# Patient Record
Sex: MALE | Race: WHITE
[De-identification: names, ages, dates, MRNs, and addresses within clinical notes are randomized per-mention and may not be internally consistent; named-entity substitution may affect disease eponyms.]

---

## 2017-10-11 ENCOUNTER — HOSPITAL ENCOUNTER (EMERGENCY)
Dept: HOSPITAL 61 - ER | Age: 42
Discharge: HOME | End: 2017-10-11
Payer: OTHER GOVERNMENT

## 2017-10-11 VITALS — SYSTOLIC BLOOD PRESSURE: 114 MMHG | DIASTOLIC BLOOD PRESSURE: 72 MMHG

## 2017-10-11 VITALS — BODY MASS INDEX: 25.48 KG/M2 | HEIGHT: 70 IN | WEIGHT: 178 LBS

## 2017-10-11 DIAGNOSIS — R10.11: ICD-10-CM

## 2017-10-11 DIAGNOSIS — R55: Primary | ICD-10-CM

## 2017-10-11 LAB
ALBUMIN SERPL-MCNC: 4.2 G/DL (ref 3.4–5)
ALP SERPL-CCNC: 125 U/L (ref 46–116)
ALT SERPL-CCNC: 108 U/L (ref 16–63)
AST SERPL-CCNC: 39 U/L (ref 15–37)
BACTERIA #/AREA URNS HPF: 0 /HPF
BASOPHILS # BLD AUTO: 0.1 X10^3/UL (ref 0–0.2)
BASOPHILS NFR BLD: 1 % (ref 0–3)
BILIRUB DIRECT SERPL-MCNC: 0.1 MG/DL (ref 0–0.2)
BILIRUB SERPL-MCNC: 0.6 MG/DL (ref 0.2–1)
BILIRUB UR QL STRIP: NEGATIVE
CO2 BLD-SCNC: 23 MMOL/L (ref 23–32)
EOSINOPHIL NFR BLD: 4 % (ref 0–3)
ERYTHROCYTE [DISTWIDTH] IN BLOOD BY AUTOMATED COUNT: 13.4 % (ref 11.5–14.5)
GLUCOSE BLD-MCNC: 100 MG/DL (ref 70–99)
GLUCOSE UR STRIP-MCNC: NEGATIVE MG/DL
HCT VFR BLD AUTO: 50 % (ref 37–52)
HCT VFR BLD CALC: 50.9 % (ref 39–53)
HGB BLD-MCNC: 17.6 G/DL (ref 13–17.5)
LYMPHOCYTES # BLD: 2.1 X10^3/UL (ref 1–4.8)
LYMPHOCYTES NFR BLD AUTO: 24 % (ref 24–48)
MAGNESIUM SERPL-MCNC: 1.7 MG/DL (ref 1.8–2.4)
MCH RBC QN AUTO: 31 PG (ref 25–35)
MCHC RBC AUTO-ENTMCNC: 35 G/DL (ref 31–37)
MCV RBC AUTO: 91 FL (ref 79–100)
MONOCYTES NFR BLD: 6 % (ref 0–9)
NEUTROPHILS NFR BLD AUTO: 65 % (ref 31–73)
NITRITE UR QL STRIP: NEGATIVE
PH UR STRIP: 5.5 [PH]
PLATELET # BLD AUTO: 248 X10^3/UL (ref 140–400)
POTASSIUM BLD-SCNC: 4.2 MMOL/L (ref 3.5–5)
PROT SERPL-MCNC: 7.9 G/DL (ref 6.4–8.2)
PROT UR STRIP-MCNC: NEGATIVE MG/DL
RBC # BLD AUTO: 5.62 X10^6/UL (ref 4.3–5.7)
RBC #/AREA URNS HPF: 0 /HPF (ref 0–2)
SODIUM SERPL-SCNC: 138 MMOL/L (ref 135–145)
SP GR UR STRIP: 1.02
SQUAMOUS #/AREA URNS LPF: (no result) /LPF
UROBILINOGEN UR-MCNC: 0.2 MG/DL
WBC # BLD AUTO: 8.6 X10^3/UL (ref 4–11)
WBC #/AREA URNS HPF: 0 /HPF (ref 0–4)

## 2017-10-11 PROCEDURE — 80047 BASIC METABLC PNL IONIZED CA: CPT

## 2017-10-11 PROCEDURE — 84484 ASSAY OF TROPONIN QUANT: CPT

## 2017-10-11 PROCEDURE — 74177 CT ABD & PELVIS W/CONTRAST: CPT

## 2017-10-11 PROCEDURE — 36415 COLL VENOUS BLD VENIPUNCTURE: CPT

## 2017-10-11 PROCEDURE — 99285 EMERGENCY DEPT VISIT HI MDM: CPT

## 2017-10-11 PROCEDURE — 93005 ELECTROCARDIOGRAM TRACING: CPT

## 2017-10-11 PROCEDURE — 96360 HYDRATION IV INFUSION INIT: CPT

## 2017-10-11 PROCEDURE — 83690 ASSAY OF LIPASE: CPT

## 2017-10-11 PROCEDURE — 71010: CPT

## 2017-10-11 PROCEDURE — 80076 HEPATIC FUNCTION PANEL: CPT

## 2017-10-11 PROCEDURE — 83735 ASSAY OF MAGNESIUM: CPT

## 2017-10-11 PROCEDURE — 81001 URINALYSIS AUTO W/SCOPE: CPT

## 2017-10-11 PROCEDURE — 85025 COMPLETE CBC W/AUTO DIFF WBC: CPT

## 2017-10-11 NOTE — RAD
Indication right upper quadrant pain.



Axial images through the abdomen and pelvis were obtained. 75 cc of Omnipaque

300 was administered intravenously. No oral contrast was administered. No

prior imaging of the abdomen or pelvis is available.



The lung bases are clear. The liver and spleen appear unremarkable. There is a

small hiatus hernia. Clips are noted in the gallbladder fossa. No adrenal or

renal pathology is seen. The pancreas appears normal. No mass inflammatory

process or acute finding in the abdomen is seen. In the pelvis the appendix is

seen in the right lower quadrant and appears unremarkable. No acute finding in

the pelvis is seen



IMPRESSION: No acute or significant finding seen in the abdomen or pelvis

## 2017-10-11 NOTE — PHYS DOC
Past Medical History


Past Medical History:  No Pertinent History





Adult General


Chief Complaint


Chief Complaint:  possible syncope vs seizure





HPI


HPI





Patient is a 42  year old male who presents after syncopal episode. Patient was 

at work and he stood up immediately had intense pain in his right upper 

quadrant or right lower chest and he went outside to get some fresh air, felt 

nauseous, sat down and then passed out. Witnesses reported some shaking-like 

activity, the patient did lose urine but he was not postictal. The first thing 

he remembers is somebody putting a wet washcloth on his head. He has had this 

pain in the past, similar to when he required his gallbladder being removed 

several years ago. Patient has intermittently had this pain but not lost 

consciousness. The pain is present at this time but significantly improved. He 

denies any recent cough, fevers, abdominal pain, dysuria.  Pt's pcp at [facility] but 

hasn't seen for some time.





Review of Systems


Review of Systems





Constitutional: Denies fever or chills []


Eyes: Denies change in visual acuity, redness, or eye pain []


HENT: Denies nasal congestion or sore throat []


Respiratory: Denies cough or shortness of breath []


Cardiovascular: points to R lower chest at site of pain


GI: Denies  stools or diarrhea []


: Denies dysuria or hematuria []


Musculoskeletal: Denies back pain or joint pain []


Integument: Denies rash or skin lesions []


Neurologic: Denies headache, focal weakness or sensory changes []





Current Medications


Current Medications





Current Medications








 Medications


  (Trade)  Dose


 Ordered  Sig/Dmitry  Start Time


 Stop Time Status Last Admin


Dose Admin


 


 Info


  (Do NOT chart on


 this entry -- for


 MONITORING)  1 each  PRN DAILY  PRN  10/11/17 12:00


 10/13/17 11:59   


 


 


 Iohexol


  (Omnipaque 300


 Mg/ml)  75 ml  1X  ONCE  10/11/17 12:00


 10/11/17 12:01 DC 10/11/17 12:21


75 ML


 


 Sodium Chloride  1,000 ml @ 


 1,000 mls/hr  1X  ONCE  10/11/17 11:15


 10/11/17 12:14 DC 10/11/17 11:15


1,000 MLS/HR











Allergies


Allergies





Allergies








Coded Allergies Type Severity Reaction Last Updated Verified


 


  No Known Drug Allergies    10/11/17 No











Physical Exam


Physical Exam





Constitutional: Well developed, well nourished, no acute distress, non-toxic 

appearance. []


HENT: Normocephalic, atraumatic, bilateral external ears normal, oropharynx 

moist, no oral exudates, nose normal. []


Eyes: PERRLA, EOMI, conjunctiva normal, no discharge. [] 


Neck: Normal range of motion, no tenderness, supple, no stridor. [] 


Cardiovascular:Heart rate regular with regular rhythm, no murmur []


Lungs & Thorax:  Bilateral breath sounds clear to auscultation []


Abdomen: Bowel sounds normal, soft, TTp in RUQ, nondistended, no peritoneal 

signs.


Skin: Warm, dry, no erythema, no rash. [] 


Back: No tenderness, no CVA tenderness. [] 


Extremities: No tenderness, no cyanosis, no clubbing, ROM intact, no edema. [] 


Neurologic: Alert and oriented X 3, normal motor function, normal sensory 

function, no focal deficits noted.CN II-XII intact, 5/5 bilateral hand 


Psychologic: Affect normal, judgement normal, mood normal. []





Current Patient Data


Vital Signs





 Vital Signs








  Date Time  Temp Pulse Resp B/P (MAP) Pulse Ox O2 Delivery O2 Flow Rate FiO2


 


10/11/17 11:10 98.1 83 20 116/73 (87) 99 Room Air  





 98.1       








Lab Values





 Laboratory Tests








Test


  10/11/17


11:11 10/11/17


11:16


 


White Blood Count


  8.6 x10^3/uL


(4.0-11.0) 


 


 


Red Blood Count


  5.62 x10^6/uL


(4.30-5.70) 


 


 


Hemoglobin


  17.6 g/dL


(13.0-17.5)  H 


 


 


Hematocrit


  50.9 %


(39.0-53.0) 


 


 


Mean Corpuscular Volume


  91 fL ()


  


 


 


Mean Corpuscular Hemoglobin 31 pg (25-35)   


 


Mean Corpuscular Hemoglobin


Concent 35 g/dL


(31-37) 


 


 


Red Cell Distribution Width


  13.4 %


(11.5-14.5) 


 


 


Platelet Count


  248 x10^3/uL


(140-400) 


 


 


Neutrophils (%) (Auto) 65 % (31-73)   


 


Lymphocytes (%) (Auto) 24 % (24-48)   


 


Monocytes (%) (Auto) 6 % (0-9)   


 


Eosinophils (%) (Auto) 4 % (0-3)  H 


 


Basophils (%) (Auto) 1 % (0-3)   


 


Neutrophils # (Auto)


  5.6 x10^3uL


(1.8-7.7) 


 


 


Lymphocytes # (Auto)


  2.1 x10^3/uL


(1.0-4.8) 


 


 


Monocytes # (Auto)


  0.5 x10^3/uL


(0.0-1.1) 


 


 


Eosinophils # (Auto)


  0.3 x10^3/uL


(0.0-0.7) 


 


 


Basophils # (Auto)


  0.1 x10^3/uL


(0.0-0.2) 


 


 


Magnesium Level


  1.7 mg/dL


(1.8-2.4)  L 


 


 


Total Bilirubin


  0.6 mg/dL


(0.2-1.0) 


 


 


Direct Bilirubin


  0.1 mg/dL


(0.0-0.2) 


 


 


Aspartate Amino Transferase


(AST) 39 U/L (15-37)


H 


 


 


Alanine Aminotransferase (ALT)


  108 U/L


(16-63)  H 


 


 


Alkaline Phosphatase


  125 U/L


()  H 


 


 


Troponin I Quantitative


  < 0.017 ng/mL


(0.000-0.055) 


 


 


Total Protein


  7.9 g/dL


(6.4-8.2) 


 


 


Albumin


  4.2 g/dL


(3.4-5.0) 


 


 


Lipase


  145 U/L


() 


 


 


POC Hemoglobin


  


  17.0 g/dL


(14-18)


 


POC Hematocrit  50 % (37-52)  


 


POC Sodium


  


  138 mmol/L


(135-145)


 


POC Potassium


  


  4.2 mmol/L


(3.5-5.0)


 


POC Chloride


  


  102 mmol/L


()


 


POC Total CO2


  


  23 mmol/L


(23-32)


 


Anion Gap


  


  18 mmol/L


(6-14)  H


 


POC Blood Urea Nitrogen


  


  12 mg/dL


(8-26)


 


POC Creatinine


  


  1.0 mg/dL


(0.5-1.4)


 


Glucose Level


  


  100 mg/dL


(70-99)  H


 


POC Ionized Calcium (Abner)


  


  1.13 mmol/L


(1.13-1.32)





 Laboratory Tests


10/11/17 11:11








 Laboratory Tests


10/11/17 11:16











EKG


EKG


76 bpm, sinus, normal axis, normal intervals, no ST elevation or depression, 

nonischemic T waves, interpreted by me[]





Radiology/Procedures


Radiology/Procedures


X-ray:


Portable chest, 10/11/2017:





History: Right upper quadrant pain, syncope





The heart size and pulmonary vascularity are normal. No pulmonary infiltrates


are seen. There is no evidence of pleural fluid.





IMPRESSION: No acute cardiopulmonary abnormality is detected.








Abd/pelvis CT:


Indication right upper quadrant pain.





Axial images through the abdomen and pelvis were obtained. 75 cc of Omnipaque


300 was administered intravenously. No oral contrast was administered. No


prior imaging of the abdomen or pelvis is available.





The lung bases are clear. The liver and spleen appear unremarkable. There is a


small hiatus hernia. Clips are noted in the gallbladder fossa. No adrenal or


renal pathology is seen. The pancreas appears normal. No mass inflammatory


process or acute finding in the abdomen is seen. In the pelvis the appendix is


seen in the right lower quadrant and appears unremarkable. No acute finding in


the pelvis is seen





IMPRESSION: No acute or significant finding seen in the abdomen or pelvis





Course & Med Decision Making


Course & Med Decision Making


Pertinent Labs and Imaging studies reviewed. (See chart for details)





[]IV fluids ordered along with lab work, urinalysis and chest x-ray.





No acute findings on Ed workup other than slightly elevated liver enzymes, 

which pt reports is chronic in natures.  I think pt had a vasovagal syncopal 

episode 2/2 to the pain.  Reportedly this intermittent pain has occurred 

multiple times in the past and self- resolves shortly.  I strongly encouraged 

pt to f/u with GI specialist for further evaluation and use caution, especially 

if the pain returns.  he voiced understanding and his wife will take pt home.





Dragon Disclaimer


Dragon Disclaimer


This electronic medical record was generated, in whole or in part, using a 

voice recognition dictation system.





Departure


Departure


Impression:  


 Primary Impression:  


 Abdominal pain


 Additional Impression:  


 Syncope


Disposition:  01 HOME, SELF-CARE


Condition:  IMPROVED





Problem Qualifiers











VALENTINA MCNEIL MD Oct 11, 2017 11:21

## 2017-10-11 NOTE — EKG
[facility]

               [address] [place] [address]

Test Date:    2017-10-11               Test Time:    11:12:15

Pat Name:     RENALDO RAMOS           Department:   

Patient ID:   [id number]           Room:          

Gender:       M                        Technician:   

:          1975               Requested By: VALENTINA MCNEIL

Order Number: [id number]            Reading MD:   Inge Priest

                                 Measurements

Intervals                              Axis          

Rate:         76                       P:            55

WV:           138                      QRS:          62

QRSD:         94                       T:            43

QT:           350                                    

QTc:          398                                    

                           Interpretive Statements

SINUS RHYTHM



NORMAL ECG



Electronically Signed On 10- 15:17:28 CDT by Inge Priest

## 2020-07-25 ENCOUNTER — TELEPHONE ENCOUNTER (OUTPATIENT)
Dept: URBAN - METROPOLITAN AREA CLINIC 13 | Facility: CLINIC | Age: 45
End: 2020-07-25

## 2020-07-26 ENCOUNTER — TELEPHONE ENCOUNTER (OUTPATIENT)
Dept: URBAN - METROPOLITAN AREA CLINIC 13 | Facility: CLINIC | Age: 45
End: 2020-07-26

## 2020-10-06 ENCOUNTER — HOSPITAL ENCOUNTER (EMERGENCY)
Dept: HOSPITAL 61 - ER | Age: 45
Discharge: HOME | End: 2020-10-06
Payer: OTHER GOVERNMENT

## 2020-10-06 VITALS — BODY MASS INDEX: 25.31 KG/M2 | HEIGHT: 71 IN | WEIGHT: 180.78 LBS

## 2020-10-06 VITALS — SYSTOLIC BLOOD PRESSURE: 132 MMHG | DIASTOLIC BLOOD PRESSURE: 90 MMHG

## 2020-10-06 DIAGNOSIS — F17.200: ICD-10-CM

## 2020-10-06 DIAGNOSIS — R07.2: Primary | ICD-10-CM

## 2020-10-06 LAB
ALBUMIN SERPL-MCNC: 4.3 G/DL (ref 3.4–5)
ALBUMIN/GLOB SERPL: 1.2 {RATIO} (ref 1–1.7)
ALP SERPL-CCNC: 122 U/L (ref 46–116)
ALT SERPL-CCNC: 137 U/L (ref 16–63)
AMPHETAMINE/METHAMPHETAMINE: (no result)
ANION GAP SERPL CALC-SCNC: 10 MMOL/L (ref 6–14)
APTT PPP: YELLOW S
AST SERPL-CCNC: 43 U/L (ref 15–37)
BACTERIA #/AREA URNS HPF: 0 /HPF
BARBITURATES UR-MCNC: (no result) UG/ML
BASOPHILS # BLD AUTO: 0.1 X10^3/UL (ref 0–0.2)
BASOPHILS NFR BLD: 1 % (ref 0–3)
BENZODIAZ UR-MCNC: (no result) UG/L
BILIRUB SERPL-MCNC: 0.5 MG/DL (ref 0.2–1)
BILIRUB UR QL STRIP: NEGATIVE
BUN SERPL-MCNC: 12 MG/DL (ref 8–26)
BUN/CREAT SERPL: 12 (ref 6–20)
CALCIUM SERPL-MCNC: 9.2 MG/DL (ref 8.5–10.1)
CANNABINOIDS UR-MCNC: (no result) UG/L
CHLORIDE SERPL-SCNC: 100 MMOL/L (ref 98–107)
CO2 SERPL-SCNC: 26 MMOL/L (ref 21–32)
COCAINE UR-MCNC: (no result) NG/ML
CREAT SERPL-MCNC: 1 MG/DL (ref 0.7–1.3)
EOSINOPHIL NFR BLD: 0.4 X10^3/UL (ref 0–0.7)
EOSINOPHIL NFR BLD: 4 % (ref 0–3)
ERYTHROCYTE [DISTWIDTH] IN BLOOD BY AUTOMATED COUNT: 13.5 % (ref 11.5–14.5)
FIBRINOGEN PPP-MCNC: CLEAR MG/DL
GFR SERPLBLD BASED ON 1.73 SQ M-ARVRAT: 80.1 ML/MIN
GLUCOSE SERPL-MCNC: 95 MG/DL (ref 70–99)
HCT VFR BLD CALC: 53.9 % (ref 39–53)
HGB BLD-MCNC: 18.9 G/DL (ref 13–17.5)
LIPASE: 102 U/L (ref 73–393)
LYMPHOCYTES # BLD: 2 X10^3/UL (ref 1–4.8)
LYMPHOCYTES NFR BLD AUTO: 24 % (ref 24–48)
MAGNESIUM SERPL-MCNC: 1.8 MG/DL (ref 1.8–2.4)
MCH RBC QN AUTO: 32 PG (ref 25–35)
MCHC RBC AUTO-ENTMCNC: 35 G/DL (ref 31–37)
MCV RBC AUTO: 93 FL (ref 79–100)
METHADONE SERPL-MCNC: (no result) NG/ML
MONO #: 0.6 X10^3/UL (ref 0–1.1)
MONOCYTES NFR BLD: 7 % (ref 0–9)
NEUT #: 5.4 X10^3/UL (ref 1.8–7.7)
NEUTROPHILS NFR BLD AUTO: 65 % (ref 31–73)
NITRITE UR QL STRIP: NEGATIVE
OPIATES UR-MCNC: (no result) NG/ML
PCP SERPL-MCNC: (no result) MG/DL
PH UR STRIP: 5.5 [PH]
PLATELET # BLD AUTO: 266 X10^3/UL (ref 140–400)
POTASSIUM SERPL-SCNC: 3.8 MMOL/L (ref 3.5–5.1)
PROT SERPL-MCNC: 7.8 G/DL (ref 6.4–8.2)
PROT UR STRIP-MCNC: NEGATIVE MG/DL
RBC # BLD AUTO: 5.83 X10^6/UL (ref 4.3–5.7)
RBC #/AREA URNS HPF: 0 /HPF (ref 0–2)
SODIUM SERPL-SCNC: 136 MMOL/L (ref 136–145)
UROBILINOGEN UR-MCNC: 0.2 MG/DL
WBC # BLD AUTO: 8.3 X10^3/UL (ref 4–11)
WBC #/AREA URNS HPF: 0 /HPF (ref 0–4)

## 2020-10-06 PROCEDURE — 80307 DRUG TEST PRSMV CHEM ANLYZR: CPT

## 2020-10-06 PROCEDURE — 83735 ASSAY OF MAGNESIUM: CPT

## 2020-10-06 PROCEDURE — 36415 COLL VENOUS BLD VENIPUNCTURE: CPT

## 2020-10-06 PROCEDURE — 83880 ASSAY OF NATRIURETIC PEPTIDE: CPT

## 2020-10-06 PROCEDURE — 99285 EMERGENCY DEPT VISIT HI MDM: CPT

## 2020-10-06 PROCEDURE — 81001 URINALYSIS AUTO W/SCOPE: CPT

## 2020-10-06 PROCEDURE — 71045 X-RAY EXAM CHEST 1 VIEW: CPT

## 2020-10-06 PROCEDURE — 84484 ASSAY OF TROPONIN QUANT: CPT

## 2020-10-06 PROCEDURE — 85025 COMPLETE CBC W/AUTO DIFF WBC: CPT

## 2020-10-06 PROCEDURE — 83690 ASSAY OF LIPASE: CPT

## 2020-10-06 PROCEDURE — 96360 HYDRATION IV INFUSION INIT: CPT

## 2020-10-06 PROCEDURE — 80053 COMPREHEN METABOLIC PANEL: CPT

## 2020-10-06 NOTE — PHYS DOC
Past Medical History


Past Medical History:  No Pertinent History


Past Surgical History:  Cholecystectomy


Smoking Status:  Current Every Day Smoker


Alcohol Use:  None


Drug Use:  None





General Adult


EDM:


Chief Complaint:  CHEST PAIN





HPI:


HPI:





Patient is a 45  year old male who presented to ER today for evaluation of s

ubsternal chest pain that been going on for 4 days.  Pain started and lasted for

about 30 seconds and will come back later.  Patient denies any cough, no fever, 

no trouble breathing.  Patient denies any recent travel or operation.  Patient 

denies any history of heart problem, no history of blood clot disorder.  Patient

denies any history of diabetic or hypertension.  Patient has history of high 

cholesterol.  Patient denies family history of heart disease or blood clot 

disorder.  Patient denies any recent travel or operation, no recent exposure to 

anybody with a positive COVID-19.





Review of Systems:


Review of Systems:


Constitutional:   Denies fever or chills. []


Eyes:   Denies change in visual acuity. []


HENT:   Denies nasal congestion or sore throat. [] 


Respiratory:   Denies cough or shortness of breath. [] 


Cardiovascular:   Positive for chest pain


GI:   Denies abdominal pain, nausea, vomiting, bloody stools or diarrhea. [] 


:  Denies dysuria. [] 


Musculoskeletal:   Denies back pain or joint pain. [] 


Integument:   Denies rash. [] 


Neurologic:   Denies headache, focal weakness or sensory changes. [] 


Endocrine:   Denies polyuria or polydipsia. [] 


Lymphatic:  Denies swollen glands. [] 


Psychiatric:  Denies depression or anxiety. []





Heart Score:


HEART Score for Chest Pain:  








HEART Score for Chest Pain Response (Comments) Value


 


History Slighlty/Non-Suspicious 0


 


ECG Normal 0


 


Age >45 - < 65 1


 


Risk Factors                            1 or 2 Risk Factors 1


 


Troponin < Normal Limit 0


 


Total  2








Risk Factors:


Risk Factors:  DM, Current or recent (<one month) smoker, HTN, HLP, family 

history of CAD, obesity.


Risk Scores:


Score 0 - 3:  2.5% MACE over next 6 weeks - Discharge Home


Score 4 - 6:  20.3% MACE over next 6 weeks - Admit for Clinical Observation


Score 7 - 10:  72.7% MACE over next 6 weeks - Early Invasive Strategies





Allergies:


Allergies:





Allergies








Coded Allergies Type Severity Reaction Last Updated Verified


 


  No Known Drug Allergies    10/11/17 No











Physical Exam:


PE:





Constitutional: Well developed, well nourished, no acute distress, non-toxic 

appearance. []


HENT: Normocephalic, atraumatic, bilateral external ears normal, oropharynx 

moist, no oral exudates, nose normal. []


Eyes: PERRLA, EOMI, conjunctiva normal, no discharge. [] 


Neck: Normal range of motion, no tenderness, supple, no stridor. [] 


Cardiovascular:Heart rate regular rhythm, no murmur []


Lungs & Thorax:  Bilateral breath sounds clear to auscultation []


Abdomen: Bowel sounds normal, soft, no tenderness, no masses, no pulsatile 

masses. [] 


Skin: Warm, dry, no erythema, no rash. [] 


Back: No tenderness, no CVA tenderness. [] 


Extremities: No tenderness, no cyanosis, no clubbing, ROM intact, no edema. [] 


Neurologic: Alert and oriented X 3, normal motor function, normal sensory 

function, no focal deficits noted. []


Psychologic: Affect normal, judgement normal, mood normal. []





Current Patient Data:


Labs:





Laboratory Tests








Test


 10/6/20


14:35 10/6/20


15:30


 


White Blood Count 8.3 x10^3/uL  


 


Red Blood Count 5.83 x10^6/uL  


 


Hemoglobin 18.9 g/dL  


 


Hematocrit 53.9 %  


 


Mean Corpuscular Volume 93 fL  


 


Mean Corpuscular Hemoglobin 32 pg  


 


Mean Corpuscular Hemoglobin


Concent 35 g/dL 


 





 


Red Cell Distribution Width 13.5 %  


 


Platelet Count 266 x10^3/uL  


 


Neutrophils (%) (Auto) 65 %  


 


Lymphocytes (%) (Auto) 24 %  


 


Monocytes (%) (Auto) 7 %  


 


Eosinophils (%) (Auto) 4 %  


 


Basophils (%) (Auto) 1 %  


 


Neutrophils # (Auto) 5.4 x10^3/uL  


 


Lymphocytes # (Auto) 2.0 x10^3/uL  


 


Monocytes # (Auto) 0.6 x10^3/uL  


 


Eosinophils # (Auto) 0.4 x10^3/uL  


 


Basophils # (Auto) 0.1 x10^3/uL  


 


Sodium Level 136 mmol/L  


 


Potassium Level 3.8 mmol/L  


 


Chloride Level 100 mmol/L  


 


Carbon Dioxide Level 26 mmol/L  


 


Anion Gap 10  


 


Blood Urea Nitrogen 12 mg/dL  


 


Creatinine 1.0 mg/dL  


 


Estimated GFR


(Cockcroft-Gault) 80.1 


 





 


BUN/Creatinine Ratio 12  


 


Glucose Level 95 mg/dL  


 


Calcium Level 9.2 mg/dL  


 


Magnesium Level 1.8 mg/dL  


 


Total Bilirubin 0.5 mg/dL  


 


Aspartate Amino Transf


(AST/SGOT) 43 U/L 


 





 


Alanine Aminotransferase


(ALT/SGPT) 137 U/L 


 





 


Alkaline Phosphatase 122 U/L  


 


Troponin I Quantitative < 0.017 ng/mL  


 


NT-Pro-B-Type Natriuretic


Peptide 8 pg/mL 


 





 


Total Protein 7.8 g/dL  


 


Albumin 4.3 g/dL  


 


Albumin/Globulin Ratio 1.2  


 


Lipase 102 U/L  


 


Urine Collection Type  Unknown 


 


Urine Color  Yellow 


 


Urine Clarity  Clear 


 


Urine pH  5.5 


 


Urine Specific Gravity  <=1.005 


 


Urine Protein  Negative mg/dL 


 


Urine Glucose (UA)  Negative mg/dL 


 


Urine Ketones (Stick)  Negative mg/dL 


 


Urine Blood  Negative 


 


Urine Nitrite  Negative 


 


Urine Bilirubin  Negative 


 


Urine Urobilinogen Dipstick  0.2 mg/dL 


 


Urine Leukocyte Esterase  Negative 


 


Urine RBC  0 /HPF 


 


Urine WBC  0 /HPF 


 


Urine Bacteria  0 /HPF 


 


Urine Opiates Screen  Neg 


 


Urine Methadone Screen  Neg 


 


Urine Barbiturates  Neg 


 


Urine Phencyclidine Screen  Neg 


 


Urine


Amphetamine/Methamphetamine 


 Neg 





 


Urine Benzodiazepines Screen  Neg 


 


Urine Cocaine Screen  Neg 


 


Urine Cannabinoids Screen  Neg 


 


Urine Ethyl Alcohol  Neg 








Current Medications








 Medications


  (Trade)  Dose


 Ordered  Sig/Dmitry


 Route


 PRN Reason  Start Time


 Stop Time Status Last Admin


Dose Admin


 


 Sodium Chloride  1,000 ml @ 


 1,000 mls/hr  1X  ONCE


 IV


   10/6/20 16:15


 10/6/20 17:14 DC 10/6/20 16:33














EKG:


EKG:


EKG was done at 1424, heart rate of 85 bpm, normal sinus rhythm, no ST segment 

ovation.





Radiology/Procedures:


Radiology/Procedures:


[][facility]


                    [address] [place] [address]


                                 [phone]


                                        


                                 IMAGING REPORT





                                     Signed





PATIENT: RENALDO RAMOS   ACCOUNT: [id number]     MRN#: [id number]


: 1975           LOCATION: ER              AGE: 45


SEX: M                    EXAM DT: 10/06/20         ACCESSION#: [id number]


STATUS: REG ER            ORD. PHYSICIAN: VERA KONG DO


REASON: CHEST PAIN


PROCEDURE: CHEST AP ONLY





CHEST AP ONLY


 


History: Reason: CHEST PAIN / Spl. Instructions:  / History: 


 


Comparison: 2017


 


Findings:


No consolidation or pleural effusion. Normal heart size. No pneumothorax.


 


Impression: 


1.  No acute cardiopulmonary process.


 


Electronically signed by: Renato Harry DO (10/6/2020 2:59 PM) [id number]














DICTATED and SIGNED BY:     RENATO HARRY DO


DATE:     10/06/20 1459





Course & Med Decision Making:


Course & Med Decision Making


Pertinent Labs and Imaging studies reviewed. (See chart for details)





Patient is a 45-year-old male who presented to ER with chest pain off and on for

 4 days, each episode lasted for few minutes, his risk factors are low, will 

send him home today, need to follow-up with cardiology for reevaluation.





Dragon Disclaimer:


Dragon Disclaimer:


This electronic medical record was generated, in whole or in part, using a voice

 recognition dictation system.





Departure


Departure


Impression:  


   Primary Impression:  


   Chest pain


Disposition:   HOME, SELF-CARE


Condition:  STABLE


Referrals:  


UNKNOWN PCP NAME (PCP)








TATE CORDOVA MD


please call this heart doctor for outpatient for follow up this week


Patient Instructions:  Chest Pain (Nonspecific)





Additional Instructions:  


Thank you for visiting our Emergency Department. We appreciate you trusting us 

with your care. If any additional problems come up don't hesitate to return to 

visit us. Please follow up with your primary care provider so they can plan 

additional care if needed and know about the problem that you had. If symptoms 

worsen come back to the Emergency Department. Any concerning symptoms that start

 such as chest pain, shortness of air, weakness or numbness on one side of the 

body, running high fevers or any other concerning symptoms return to the ER.











VERA KONG DO                 Oct 6, 2020 15:08

## 2020-10-06 NOTE — RAD
CHEST AP ONLY

 

History: Reason: CHEST PAIN / Spl. Instructions:  / History: 

 

Comparison: October 11, 2017

 

Findings:

No consolidation or pleural effusion. Normal heart size. No pneumothorax.

 

Impression: 

1.  No acute cardiopulmonary process.

 

Electronically signed by: Renato Harry DO (10/6/2020 2:59 PM) [id number]